# Patient Record
Sex: MALE | Race: WHITE
[De-identification: names, ages, dates, MRNs, and addresses within clinical notes are randomized per-mention and may not be internally consistent; named-entity substitution may affect disease eponyms.]

---

## 2019-02-28 ENCOUNTER — HOSPITAL ENCOUNTER (OUTPATIENT)
Dept: HOSPITAL 53 - M SFHCLERA | Age: 54
End: 2019-02-28
Attending: NURSE PRACTITIONER
Payer: COMMERCIAL

## 2019-02-28 DIAGNOSIS — Z76.89: Primary | ICD-10-CM

## 2019-02-28 LAB
ALBUMIN SERPL BCG-MCNC: 4.2 GM/DL (ref 3.2–5.2)
ALT SERPL W P-5'-P-CCNC: 50 U/L (ref 12–78)
BASOPHILS # BLD AUTO: 0 10^3/UL (ref 0–0.2)
BASOPHILS NFR BLD AUTO: 0.6 % (ref 0–1)
BILIRUB SERPL-MCNC: 0.8 MG/DL (ref 0.2–1)
BUN SERPL-MCNC: 17 MG/DL (ref 7–18)
CALCIUM SERPL-MCNC: 9.1 MG/DL (ref 8.5–10.1)
CHLORIDE SERPL-SCNC: 105 MEQ/L (ref 98–107)
CHOLEST SERPL-MCNC: 201 MG/DL (ref ?–200)
CHOLEST/HDLC SERPL: 3.41 {RATIO} (ref ?–5)
CO2 SERPL-SCNC: 29 MEQ/L (ref 21–32)
CREAT SERPL-MCNC: 0.94 MG/DL (ref 0.7–1.3)
EOSINOPHIL # BLD AUTO: 0.1 10^3/UL (ref 0–0.5)
EOSINOPHIL NFR BLD AUTO: 1.3 % (ref 0–3)
GFR SERPL CREATININE-BSD FRML MDRD: > 60 ML/MIN/{1.73_M2} (ref 56–?)
GLUCOSE SERPL-MCNC: 90 MG/DL (ref 70–100)
HCT VFR BLD AUTO: 40.4 % (ref 42–52)
HDLC SERPL-MCNC: 59 MG/DL (ref 40–?)
HGB BLD-MCNC: 13.5 G/DL (ref 13.5–17.5)
LDLC SERPL CALC-MCNC: 123 MG/DL (ref ?–100)
LYMPHOCYTES # BLD AUTO: 1.2 10^3/UL (ref 1.5–4.5)
LYMPHOCYTES NFR BLD AUTO: 24.1 % (ref 24–44)
MCH RBC QN AUTO: 31.5 PG (ref 27–33)
MCHC RBC AUTO-ENTMCNC: 33.4 G/DL (ref 32–36.5)
MCV RBC AUTO: 94.4 FL (ref 80–96)
MONOCYTES # BLD AUTO: 0.5 10^3/UL (ref 0–0.8)
MONOCYTES NFR BLD AUTO: 10.1 % (ref 0–5)
NEUTROPHILS # BLD AUTO: 3.1 10^3/UL (ref 1.8–7.7)
NEUTROPHILS NFR BLD AUTO: 63.9 % (ref 36–66)
NONHDLC SERPL-MCNC: 142 MG/DL
PLATELET # BLD AUTO: 340 10^3/UL (ref 150–450)
POTASSIUM SERPL-SCNC: 5 MEQ/L (ref 3.5–5.1)
PROT SERPL-MCNC: 7 GM/DL (ref 6.4–8.2)
RBC # BLD AUTO: 4.28 10^6/UL (ref 4.3–6.1)
SODIUM SERPL-SCNC: 139 MEQ/L (ref 136–145)
TRIGL SERPL-MCNC: 93 MG/DL (ref ?–150)
WBC # BLD AUTO: 4.8 10^3/UL (ref 4–10)

## 2019-03-06 ENCOUNTER — HOSPITAL ENCOUNTER (OUTPATIENT)
Dept: HOSPITAL 53 - M RAD | Age: 54
End: 2019-03-06
Attending: NURSE PRACTITIONER
Payer: COMMERCIAL

## 2019-03-06 DIAGNOSIS — N50.3: Primary | ICD-10-CM

## 2019-03-07 NOTE — REP
Clinical:  Testicular mass.

 

Technique:  Real time gray scale and color Doppler evaluation using linear high

frequency transducer.

 

Findings:

The bilateral testicles are normal in contour, size, echogenicity, and

vascularity without evidence for intratesticular mass lesion,

infectious/inflammatory process, or torsion.  Right epididymal head cysts measure

2.3 x 2.5 x 2.8 mm and 1.7 x 1.4 x 2.9 mm.  Left epididymal head cysts measure 16

x 10 x 19 mm and 8 x 5 x 6 mm.  Small bilateral hydroceles are noted for (right

greater than left).  No varicoceles.

 

Right testicle measures 4.9 x 2.2 x 3.1 cm.

Left testicle measures 4.3 x 2.1 x 3.2 cm.

 

Impression:

1.  No testicular mass lesion.

2.  Bilateral epididymal cysts (left greater than right) and small bilateral

hydroceles.

 

 

Electronically Signed by

Fernando Sampson MD 03/07/2019 05:02 A